# Patient Record
(demographics unavailable — no encounter records)

---

## 2024-12-04 NOTE — END OF VISIT
[] : Resident [FreeTextEntry3] : #knee pain- got lidocaine patch and seeing ortho soon to evaluate  #RUQ pain - been a year. comes and goes. get US abd . get cbc, cmp,

## 2024-12-04 NOTE — PHYSICAL EXAM
[No Acute Distress] : no acute distress [No Respiratory Distress] : no respiratory distress  [No Accessory Muscle Use] : no accessory muscle use [Clear to Auscultation] : lungs were clear to auscultation bilaterally [Normal Rate] : normal rate  [Regular Rhythm] : with a regular rhythm [Normal S1, S2] : normal S1 and S2 [Soft] : abdomen soft [Non Tender] : non-tender [Non-distended] : non-distended [Normal Bowel Sounds] : normal bowel sounds [No CVA Tenderness] : no CVA  tenderness [No Joint Swelling] : no joint swelling [No Rash] : no rash

## 2024-12-04 NOTE — HISTORY OF PRESENT ILLNESS
[FreeTextEntry1] : Pt states she is here for a follow up. 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 Last month the pain returned for 2 weeks, described as stabbing and burning and radiates to the back. Pain is constant, nothing makes it better or worse. Tries not to sleep on the right side to avoid the pain. Has a hx of gallstone, but gallbladder was removed 2015. Also had a hx of a kidney stone many years ago. No alcohol use. Denies any association with eating, breathing, recent trauma, URI symptoms, dyspnea on exertion or chest pain.   Medial L knee pain is resolving, scheduled for ortho for injection on 12/18.

## 2024-12-04 NOTE — HEALTH RISK ASSESSMENT
[0] : 2) Feeling down, depressed, or hopeless: Not at all (0) [PHQ-9 Negative - No further assessment needed] : PHQ-9 Negative - No further assessment needed [Never] : Never [[id number]] : 0

## 2024-12-04 NOTE — ASSESSMENT
[FreeTextEntry1] : RTC to discuss results after imaging obtained or sooner if needed.  Discussed case with Dr. Moon.

## 2024-12-04 NOTE — HISTORY OF PRESENT ILLNESS
[FreeTextEntry1] : Pt states she is here for a follow up. 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 Last month the pain returned for 2 weeks, described as stabbing and burning and radiates to the back. Pain is constant, nothing makes it better or worse. Tries not to sleep on the right side to avoid the pain. Has a hx of gallstone, but gallbladder was removed 2015. Also had a hx of a kidney stone many years ago. No alcohol use. Denies any association with eating, breathing, recent trauma, URI symptoms, dyspnea on exertion or chest pain.   Medial L knee pain is resolving, scheduled for ortho for injection on 12/18.

## 2024-12-18 NOTE — PROCEDURE
[de-identified] :  Procedure: Knee joint injection Laterality:  LEFT Indication: Osteoarthritis - discussed with patient Skin prep: alcohol and chlorhexidine Anesthesia: ethyl chloride spray Needle: 20-gauge Portal: inferolateral Aspiration: none Injectate: 2cc of 2% lidocaine, 2cc of 0.5% bupivacaine, and 1cc of 40mg/mL triamcinolone Dressing: Band-aid Complications: None  -RTC in 3 months to repeat left knee CSI

## 2025-03-12 NOTE — PROCEDURE
[de-identified] :  Procedure: Knee joint injection Laterality:  LEFT Indication: Osteoarthritis - discussed with patient Skin prep: alcohol and chlorhexidine Anesthesia: ethyl chloride spray Needle: 20-gauge Portal: inferolateral Aspiration: none Injectate: 2cc of 2% lidocaine, 2cc of 0.5% bupivacaine, and 1cc of 40mg/mL triamcinolone Dressing: Band-aid Complications: None  -RTC in 3 months to repeat left knee CSI

## 2025-04-01 NOTE — ASSESSMENT
[FreeTextEntry1] : Pt is a 51F with PMHx of DM I complicated by CKD s/p pancreas and kidney transplant ~20 years ago, neuropathy in b/l hands/feet and OA of b/l knees.    #Abdominal pain  #Umbilical hernia  Per chart review pt with Abd pain with Abd US in Dec 2024 wnl. No presenting today for 2 weeks of lower intermittent sharp abdominal pain located in the periumbilical area, lasting from a few seconds to a few min. Pain not associated with food (may have improved with less dairy?), night/day or positioning (Lying and standing). Pt reports soft stools alternating with constipation. Denies fevers, chills, dysuria, dark or bloody stools. On exam abd with well healed surgical scars with umbilical hernia that is easily reduced, Abd NTND. At time of exam pt reports no symptoms. Abd US wnl. Unclear etiology of pain however possibly 2/2 to hernia.  - Continue to monitor RTC in 3 months (consider further imaging if persistent symptoms)  -  if worsening pain or noticed enlargement RTC sooner for eval and possible surgical referral  - f/u with GI/Nephro/Transplant at [facility] (On Cyclosporine 50BID, Mycophenolate 360) - per pt C- scope in 2023 (next due in 2033), EGD done at that time as well.   #Neuropathy  Pt with neuropathy of b/l hands/feet on Gabapentin.  - Refill of Gabapentin 900mg qhs   #HSV suppression  Pt with PMHx of HSV on suppression therapy with Valacyclovir   #HCM - C-scope last in 2022-23 (due in 2033) - [facility] due, pt has apt on 4/14/25 - Tdap/PCV23 - 2023, Flu 12/24 - RTC 3 months

## 2025-04-01 NOTE — PHYSICAL EXAM
[No Acute Distress] : no acute distress [Normal] : affect was normal and insight and judgment were intact [Soft] : abdomen soft [Non Tender] : non-tender [de-identified] : Umbilical hernia easily reduced

## 2025-04-01 NOTE — HISTORY OF PRESENT ILLNESS
[FreeTextEntry1] : pt is here for follow up. [de-identified] : Pt is a 51F with PMHx of DM I complicated by CKD s/p pancreas and kidney transplant ~20 years ago, neuropathy in b/l hands/feet and OA of b/l knees. Presenting today for 2 weeks of lower abdominal pain located in the periumbilical area described as sharp stabbing pain comes and goes, lasting from a few seconds to a few min. Pain not associated with food (may have improved with less dairy?), night/day or positioning (Lying and standing). Pt reports soft stools alternating with constipation. Denies fevers, chills, SOB, CP, dysuria, dark or bloody stools. Pt follows with GI and Nephro at [facility]  - per pt C- scope in 2023 (next due in 2033), EGD done at that time as well.

## 2025-04-01 NOTE — REVIEW OF SYSTEMS
[Abdominal Pain] : abdominal pain [Nausea] : nausea [Constipation] : constipation [Negative] : Heme/Lymph [Diarrhea] : diarrhea [Vomiting] : no vomiting [Melena] : no melena

## 2025-04-01 NOTE — ASSESSMENT
[FreeTextEntry1] : Pt is a 51F with PMHx of DM I complicated by CKD s/p pancreas and kidney transplant ~20 years ago, neuropathy in b/l hands/feet and OA of b/l knees.    #Abdominal pain  #Umbilical hernia  Per chart review pt with Abd pain with Abd US in Dec 2024 wnl. No presenting today for 2 weeks of lower intermittent sharp abdominal pain located in the periumbilical area, lasting from a few seconds to a few min. Pain not associated with food (may have improved with less dairy?), night/day or positioning (Lying and standing). Pt reports soft stools alternating with constipation. Denies fevers, chills, dysuria, dark or bloody stools. On exam abd with well healed surgical scars with umbilical hernia that is easily reduced, Abd NTND. At time of exam pt reports no symptoms. Abd US wnl. Unclear etiology of pain however possibly 2/2 to hernia.  - Continue to monitor RTC in 3 months (consider further imaging if persistent symptoms)  -  if worsening pain or noticed enlargement RTC sooner for eval and possible surgical referral  - f/u with GI/Nephro/Transplant at [facility] (On Cyclosporine 50BID, Mycophenolate 360) - per pt C- scope in 2023 (next due in 2033), EGD done at that time as well.   #Neuropathy  Pt with neuropathy of b/l hands/feet on Gabapentin.  - Refill of Gabapentin 900mg qhs   #HSV suppression  Pt with PMHx of HSV on suppression therapy with Valacyclovir   #HCM - C-scope last in 2022-23 (due in 2033) - [facility] due, pt has apt on 4/14/25 - Tdap/PCV23 - 2023, Flu 12/24 - RTC 3 months

## 2025-04-01 NOTE — PHYSICAL EXAM
[No Acute Distress] : no acute distress [Normal] : affect was normal and insight and judgment were intact [Soft] : abdomen soft [Non Tender] : non-tender [de-identified] : Umbilical hernia easily reduced

## 2025-04-01 NOTE — HISTORY OF PRESENT ILLNESS
[FreeTextEntry1] : pt is here for follow up. [de-identified] : Pt is a 51F with PMHx of DM I complicated by CKD s/p pancreas and kidney transplant ~20 years ago, neuropathy in b/l hands/feet and OA of b/l knees. Presenting today for 2 weeks of lower abdominal pain located in the periumbilical area described as sharp stabbing pain comes and goes, lasting from a few seconds to a few min. Pain not associated with food (may have improved with less dairy?), night/day or positioning (Lying and standing). Pt reports soft stools alternating with constipation. Denies fevers, chills, SOB, CP, dysuria, dark or bloody stools. Pt follows with GI and Nephro at [facility]  - per pt C- scope in 2023 (next due in 2033), EGD done at that time as well.

## 2025-04-01 NOTE — END OF VISIT
[] : Resident [FreeTextEntry3] : Patient is presenting with abdominal pain.  She states the pain is in the area of the umbilicus and the last 2 to 3 seconds.  Occasionally it lasts a minute or 2.  Is not affected by eating, defecation, flatulence or position.  On exam the patient has a 2 to 3 cm umbilical hernia.  She states that the location of her discomfort. Patient has a previous history of kidney transplant more than 20 years ago and presented with upper abdominal pain to this clinic in the past.  An ultrasound at that time was normal other than the presence of the kidney in the right lower quadrant and atrophied native kidneys.  I suspect the patient's discomfort is related to umbilical hernia and have advised her to let us know if it gets worse at which point we will refer to surgery for evaluation.

## 2025-06-03 NOTE — HISTORY OF PRESENT ILLNESS
[FreeTextEntry1] : pt is here for follow up. [de-identified] : Pt is a 52YF with PMH of DM I complicated by CKD s/p pancreas and kidney transplant ~20 years ago, neuropathy in b/l hands/feet and OA of b/l knees presenting today for follow-up due to one week of nausea with associated headache for48 hours as well as left back pain. Patient states that she has had nausea with coughing and a little phlegm for about a week. She believes she had a recent sick contact as her mother had an URI infection on the same timeline. States having a headache for 48 hours which subsided but was present on her temples bilaterally and improved with laying with the back of her head on the pillow. Denies any changes in vision, hearing, ear fullness, dizziness, of lack of balance. Said she tried tylenol 500 mg x1 without relief. Not currently experiencing a headache. States reduced appetite and water intake for about a week due to the nausea.

## 2025-06-03 NOTE — END OF VISIT
[] : Resident [FreeTextEntry3] : pt seen with Dr. Young.  agree with assessment and plan as noted.  given recent symptoms , discussed possibility of viral syndrome as etiology of recent symptoms.  will monitor for now and follow up as noted above if not improving.

## 2025-06-03 NOTE — REVIEW OF SYSTEMS
[Chills] : chills [Recent Change In Weight] : ~T recent weight change [Cough] : cough [Muscle Pain] : muscle pain [Headache] : headache [Sore Throat] : no sore throat [Chest Pain] : no chest pain [Palpitations] : no palpitations [Shortness Of Breath] : no shortness of breath [Abdominal Pain] : no abdominal pain [Vomiting] : no vomiting [Dysuria] : no dysuria [Itching] : no itching [Skin Rash] : no skin rash [Dizziness] : no dizziness [Confusion] : no confusion [Unsteady Walking] : no ataxia [FreeTextEntry2] : weight loss due to nausea  [FreeTextEntry9] : left rib pain [de-identified] : headache along temples relieved with laying on back

## 2025-06-03 NOTE — REVIEW OF SYSTEMS
[Chills] : chills [Recent Change In Weight] : ~T recent weight change [Cough] : cough [Muscle Pain] : muscle pain [Headache] : headache [Sore Throat] : no sore throat [Chest Pain] : no chest pain [Palpitations] : no palpitations [Shortness Of Breath] : no shortness of breath [Abdominal Pain] : no abdominal pain [Vomiting] : no vomiting [Dysuria] : no dysuria [Itching] : no itching [Skin Rash] : no skin rash [Dizziness] : no dizziness [Confusion] : no confusion [Unsteady Walking] : no ataxia [FreeTextEntry2] : weight loss due to nausea  [FreeTextEntry9] : left rib pain [de-identified] : headache along temples relieved with laying on back

## 2025-06-03 NOTE — PHYSICAL EXAM
[No Acute Distress] : no acute distress [EOMI] : extraocular movements intact [Normal Outer Ear/Nose] : the outer ears and nose were normal in appearance [No Respiratory Distress] : no respiratory distress  [No Accessory Muscle Use] : no accessory muscle use [Clear to Auscultation] : lungs were clear to auscultation bilaterally [Normal Rate] : normal rate  [Regular Rhythm] : with a regular rhythm [No Edema] : there was no peripheral edema [Soft] : abdomen soft [Non Tender] : non-tender [No HSM] : no HSM [Normal Bowel Sounds] : normal bowel sounds [No Joint Swelling] : no joint swelling [No Rash] : no rash [No Focal Deficits] : no focal deficits [Normal Affect] : the affect was normal [Normal Insight/Judgement] : insight and judgment were intact [de-identified] : weak-appearing  [de-identified] : left sided pain on back mid-lower back

## 2025-06-03 NOTE — HISTORY OF PRESENT ILLNESS
[FreeTextEntry1] : pt is here for follow up. [de-identified] : Pt is a 52YF with PMH of DM I complicated by CKD s/p pancreas and kidney transplant ~20 years ago, neuropathy in b/l hands/feet and OA of b/l knees presenting today for follow-up due to one week of nausea with associated headache for48 hours as well as left back pain. Patient states that she has had nausea with coughing and a little phlegm for about a week. She believes she had a recent sick contact as her mother had an URI infection on the same timeline. States having a headache for 48 hours which subsided but was present on her temples bilaterally and improved with laying with the back of her head on the pillow. Denies any changes in vision, hearing, ear fullness, dizziness, of lack of balance. Said she tried tylenol 500 mg x1 without relief. Not currently experiencing a headache. States reduced appetite and water intake for about a week due to the nausea.

## 2025-06-03 NOTE — PHYSICAL EXAM
[No Acute Distress] : no acute distress [EOMI] : extraocular movements intact [Normal Outer Ear/Nose] : the outer ears and nose were normal in appearance [No Respiratory Distress] : no respiratory distress  [No Accessory Muscle Use] : no accessory muscle use [Clear to Auscultation] : lungs were clear to auscultation bilaterally [Normal Rate] : normal rate  [Regular Rhythm] : with a regular rhythm [No Edema] : there was no peripheral edema [Soft] : abdomen soft [Non Tender] : non-tender [No HSM] : no HSM [Normal Bowel Sounds] : normal bowel sounds [No Joint Swelling] : no joint swelling [No Rash] : no rash [No Focal Deficits] : no focal deficits [Normal Affect] : the affect was normal [Normal Insight/Judgement] : insight and judgment were intact [de-identified] : weak-appearing  [de-identified] : left sided pain on back mid-lower back